# Patient Record
Sex: MALE | Race: AMERICAN INDIAN OR ALASKA NATIVE
[De-identification: names, ages, dates, MRNs, and addresses within clinical notes are randomized per-mention and may not be internally consistent; named-entity substitution may affect disease eponyms.]

---

## 2022-07-06 ENCOUNTER — HOSPITAL ENCOUNTER (EMERGENCY)
Dept: HOSPITAL 5 - ED | Age: 47
LOS: 5 days | Discharge: PSYCHIATRIC OR SUBSTANCE USE FACILITY | End: 2022-07-11
Payer: SELF-PAY

## 2022-07-06 DIAGNOSIS — Z79.899: ICD-10-CM

## 2022-07-06 DIAGNOSIS — Z20.822: ICD-10-CM

## 2022-07-06 DIAGNOSIS — F22: Primary | ICD-10-CM

## 2022-07-06 DIAGNOSIS — F20.9: ICD-10-CM

## 2022-07-06 LAB
ALBUMIN SERPL-MCNC: 4.3 G/DL (ref 3.9–5)
ALT SERPL-CCNC: 18 UNITS/L (ref 7–56)
BASOPHILS # (AUTO): 0.1 K/MM3 (ref 0–0.1)
BASOPHILS NFR BLD AUTO: 0.6 % (ref 0–1.8)
BUN SERPL-MCNC: 5 MG/DL (ref 9–20)
BUN/CREAT SERPL: 6 %
CALCIUM SERPL-MCNC: 9.7 MG/DL (ref 8.4–10.2)
EOSINOPHIL # BLD AUTO: 0 K/MM3 (ref 0–0.4)
EOSINOPHIL NFR BLD AUTO: 0.2 % (ref 0–4.3)
HCT VFR BLD CALC: 33.8 % (ref 35.5–45.6)
HEMOLYSIS INDEX: 7
HGB BLD-MCNC: 11.6 GM/DL (ref 11.8–15.2)
LYMPHOCYTES # BLD AUTO: 1.4 K/MM3 (ref 1.2–5.4)
LYMPHOCYTES NFR BLD AUTO: 13.7 % (ref 13.4–35)
MCHC RBC AUTO-ENTMCNC: 34 % (ref 32–34)
MCV RBC AUTO: 90 FL (ref 84–94)
MONOCYTES # (AUTO): 0.6 K/MM3 (ref 0–0.8)
MONOCYTES % (AUTO): 5.9 % (ref 0–7.3)
PLATELET # BLD: 258 K/MM3 (ref 140–440)
RBC # BLD AUTO: 3.74 M/MM3 (ref 3.65–5.03)

## 2022-07-06 PROCEDURE — 81001 URINALYSIS AUTO W/SCOPE: CPT

## 2022-07-06 PROCEDURE — 99285 EMERGENCY DEPT VISIT HI MDM: CPT

## 2022-07-06 PROCEDURE — 96372 THER/PROPH/DIAG INJ SC/IM: CPT

## 2022-07-06 PROCEDURE — G0480 DRUG TEST DEF 1-7 CLASSES: HCPCS

## 2022-07-06 PROCEDURE — 36415 COLL VENOUS BLD VENIPUNCTURE: CPT

## 2022-07-06 PROCEDURE — U0003 INFECTIOUS AGENT DETECTION BY NUCLEIC ACID (DNA OR RNA); SEVERE ACUTE RESPIRATORY SYNDROME CORONAVIRUS 2 (SARS-COV-2) (CORONAVIRUS DISEASE [COVID-19]), AMPLIFIED PROBE TECHNIQUE, MAKING USE OF HIGH THROUGHPUT TECHNOLOGIES AS DESCRIBED BY CMS-2020-01-R: HCPCS

## 2022-07-06 PROCEDURE — 80320 DRUG SCREEN QUANTALCOHOLS: CPT

## 2022-07-06 PROCEDURE — 80053 COMPREHEN METABOLIC PANEL: CPT

## 2022-07-06 PROCEDURE — 85025 COMPLETE CBC W/AUTO DIFF WBC: CPT

## 2022-07-06 PROCEDURE — 80307 DRUG TEST PRSMV CHEM ANLYZR: CPT

## 2022-07-06 NOTE — EMERGENCY DEPARTMENT REPORT
ED General Adult HPI





- General


Chief complaint: Psych


Stated complaint: MENTAL PROBLEMS/HAVENT TAKEN MEDS


Time Seen by Provider: 07/06/22 20:25


Source: patient


Mode of arrival: Ambulatory


Limitations: No Limitations





- History of Present Illness


Initial comments: 





The patient presents to the emergency department with a chief complaint of 

abandonment.  Patient has a past medical history of schizoaffective disorder.  

Patient states he was staying with a  for the last 2 to 3 weeks.  He 

states today he was told by the  that he was becoming too difficult to 

manage and brought him to the hospital for evaluation.  Patient slightly 

paranoid on exam.  He denies homicidal suicidal ideation.  He also denies 

auditory visual hallucinations.


Severity scale (0 -10): 0


Improves with: none


Worsens with: none


Associated Symptoms: denies other symptoms


Treatments Prior to Arrival: none





- Related Data


                                    Allergies











Allergy/AdvReac Type Severity Reaction Status Date / Time


 


No Known Allergies Allergy   Unverified 07/06/22 20:06














ED Review of Systems


ROS: 


Stated complaint: MENTAL PROBLEMS/HAVENT TAKEN MEDS


Other details as noted in HPI





Comment: All other systems reviewed and negative


Constitutional: denies: chills, fever


Eyes: denies: eye pain, eye discharge, vision change


ENT: denies: ear pain, throat pain


Respiratory: denies: cough, shortness of breath, wheezing


Cardiovascular: denies: chest pain, palpitations


Endocrine: no symptoms reported


Gastrointestinal: denies: abdominal pain, nausea, diarrhea


Genitourinary: denies: urgency, dysuria


Musculoskeletal: denies: back pain, joint swelling, arthralgia


Skin: denies: rash, lesions


Neurological: denies: headache, weakness, paresthesias


Psychiatric: denies: anxiety, depression


Hematological/Lymphatic: denies: easy bleeding, easy bruising





ED Past Medical Hx





- Past Medical History


Previous Medical History?: Yes


Additional medical history: schizophrenia





- Surgical History


Past Surgical History?: No





- Social History


Smoking Status: Unknown if ever smoked





ED Physical Exam





- General


Limitations: No Limitations


General appearance: alert, in no apparent distress





- Head


Head exam: Present: atraumatic, normocephalic





- Eye


Eye exam: Present: normal appearance, PERRL, EOMI





- ENT


ENT exam: Present: mucous membranes moist





- Neck


Neck exam: Present: normal inspection





- Respiratory


Respiratory exam: Present: normal lung sounds bilaterally.  Absent: respiratory 

distress





- Cardiovascular


Cardiovascular Exam: Present: regular rate, normal rhythm.  Absent: systolic 

murmur, diastolic murmur, rubs, gallop





- GI/Abdominal


GI/Abdominal exam: Present: soft, normal bowel sounds.  Absent: distended, 

tenderness





- Rectal


Rectal exam: Present: deferred





- Extremities Exam


Extremities exam: Present: normal inspection





- Back Exam


Back exam: Present: normal inspection





- Neurological Exam


Neurological exam: Present: alert, oriented X3, CN II-XII intact.  Absent: motor

sensory deficit





- Psychiatric


Psychiatric exam: Present: other (paranoid)





- Skin


Skin exam: Present: warm, dry, intact, normal color.  Absent: rash





ED Course


                                   Vital Signs











  07/06/22 07/06/22





  20:03 21:03


 


Temperature 98.0 F 


 


Pulse Rate 106 H 111 H


 


Respiratory 16 19





Rate  


 


Blood Pressure 223/105 


 


Blood Pressure  187/122





[Right]  


 


O2 Sat by Pulse 97 99





Oximetry  














ED Medical Decision Making





- Lab Data


Result diagrams: 


                                 07/06/22 21:11





                                 07/06/22 21:11








                                   Lab Results











  07/06/22 07/06/22 07/06/22 Range/Units





  21:11 21:11 21:11 


 


WBC  10.2    (4.5-11.0)  K/mm3


 


RBC  3.74    (3.65-5.03)  M/mm3


 


Hgb  11.6 L    (11.8-15.2)  gm/dl


 


Hct  33.8 L    (35.5-45.6)  %


 


MCV  90    (84-94)  fl


 


MCH  31    (28-32)  pg


 


MCHC  34    (32-34)  %


 


RDW  14.2    (13.2-15.2)  %


 


Plt Count  258    (140-440)  K/mm3


 


Lymph % (Auto)  13.7    (13.4-35.0)  %


 


Mono % (Auto)  5.9    (0.0-7.3)  %


 


Eos % (Auto)  0.2    (0.0-4.3)  %


 


Baso % (Auto)  0.6    (0.0-1.8)  %


 


Lymph # (Auto)  1.4    (1.2-5.4)  K/mm3


 


Mono # (Auto)  0.6    (0.0-0.8)  K/mm3


 


Eos # (Auto)  0.0    (0.0-0.4)  K/mm3


 


Baso # (Auto)  0.1    (0.0-0.1)  K/mm3


 


Seg Neutrophils %  79.6 H    (40.0-70.0)  %


 


Seg Neutrophils #  8.1 H    (1.8-7.7)  K/mm3


 


Sodium     (137-145)  mmol/L


 


Potassium     (3.6-5.0)  mmol/L


 


Chloride     ()  mmol/L


 


Carbon Dioxide     (22-30)  mmol/L


 


Anion Gap     mmol/L


 


BUN     (9-20)  mg/dL


 


Creatinine     (0.8-1.3)  mg/dL


 


Estimated GFR     ml/min


 


BUN/Creatinine Ratio     %


 


Glucose     ()  mg/dL


 


Calcium     (8.4-10.2)  mg/dL


 


Total Bilirubin     (0.1-1.2)  mg/dL


 


AST     (5-40)  units/L


 


ALT     (7-56)  units/L


 


Alkaline Phosphatase     ()  units/L


 


Total Protein     (6.3-8.2)  g/dL


 


Albumin     (3.9-5)  g/dL


 


Albumin/Globulin Ratio     %


 


Salicylates   < 0.3 L   (2.8-20.0)  mg/dL


 


Acetaminophen    5.0 L  (10.0-30.0)  ug/mL


 


Plasma/Serum Alcohol     (0-0.07)  %














  07/06/22 07/06/22 Range/Units





  21:11 21:11 


 


WBC    (4.5-11.0)  K/mm3


 


RBC    (3.65-5.03)  M/mm3


 


Hgb    (11.8-15.2)  gm/dl


 


Hct    (35.5-45.6)  %


 


MCV    (84-94)  fl


 


MCH    (28-32)  pg


 


MCHC    (32-34)  %


 


RDW    (13.2-15.2)  %


 


Plt Count    (140-440)  K/mm3


 


Lymph % (Auto)    (13.4-35.0)  %


 


Mono % (Auto)    (0.0-7.3)  %


 


Eos % (Auto)    (0.0-4.3)  %


 


Baso % (Auto)    (0.0-1.8)  %


 


Lymph # (Auto)    (1.2-5.4)  K/mm3


 


Mono # (Auto)    (0.0-0.8)  K/mm3


 


Eos # (Auto)    (0.0-0.4)  K/mm3


 


Baso # (Auto)    (0.0-0.1)  K/mm3


 


Seg Neutrophils %    (40.0-70.0)  %


 


Seg Neutrophils #    (1.8-7.7)  K/mm3


 


Sodium  141   (137-145)  mmol/L


 


Potassium  3.5 L   (3.6-5.0)  mmol/L


 


Chloride  105.9   ()  mmol/L


 


Carbon Dioxide  22   (22-30)  mmol/L


 


Anion Gap  17   mmol/L


 


BUN  5 L   (9-20)  mg/dL


 


Creatinine  0.9   (0.8-1.3)  mg/dL


 


Estimated GFR  > 60   ml/min


 


BUN/Creatinine Ratio  6   %


 


Glucose  110 H   ()  mg/dL


 


Calcium  9.7   (8.4-10.2)  mg/dL


 


Total Bilirubin  0.30   (0.1-1.2)  mg/dL


 


AST  17   (5-40)  units/L


 


ALT  18   (7-56)  units/L


 


Alkaline Phosphatase  49   ()  units/L


 


Total Protein  6.7   (6.3-8.2)  g/dL


 


Albumin  4.3   (3.9-5)  g/dL


 


Albumin/Globulin Ratio  1.8   %


 


Salicylates    (2.8-20.0)  mg/dL


 


Acetaminophen    (10.0-30.0)  ug/mL


 


Plasma/Serum Alcohol   < 0.01  (0-0.07)  %














- Medical Decision Making





Awaiting medical clearance


Patient was evaluated by mental health and the patient will be awaiting a 

psychiatric evaluation in the morning due to inconsistencies with his story.


Critical care attestation.: 


If time is entered above; I have spent that time in minutes in the direct care 

of this critically ill patient, excluding procedure time.








ED Disposition


Clinical Impression: 


 Paranoia





Disposition: [address] [place] [address]


Is pt being admited?: No


Does the pt Need Aspirin: No


Condition: Stable


Additional Instructions: 


Professional and Agency Contacts To help Resolve Crises(24/7)


[place] Crisis Line: 1-345.907.6089


Suicide Prevention Line: 1-683.786.2091


Crisis Text Line: Text START to 815787


Emergency: 911





Outpatient COMMUNITY Behavioral Health Resources:





[place]: [facility]


[address], [place] [address] Phone: [phone]





[place]:


[facility]


[address] [place] [address]


Phone: [phone]





[place]:


[facility] -


[address] [place] [address]


Phone: [phone]


Monday thru Friday - 8am - 5pm





[facility]:


[facility]


Address: [address], [place] [address]


Phone: [phone]





[place]:


[facility]


Address: [address] [place] [address]


Monday thru Friday- 7am-2pm


Phone: [phone]





[facility]


Address: [address] [place] [address]


Monday thru Friday: 8:30AM-5PM


Phone: [phone]

## 2022-07-07 LAB
MUCOUS THREADS #/AREA URNS HPF: (no result) /HPF
PH UR STRIP: 6.5 [PH] (ref 5–7)
PROT UR STRIP-MCNC: (no result) MG/DL
UROBILINOGEN UR-MCNC: 0 MG/DL (ref ?–2)
WBC #/AREA URNS HPF: 1 /HPF (ref 0–6)

## 2022-07-07 NOTE — EVENT NOTE
Date: 07/07/22





Patient reassessed today and is minimally verbal.  He is in no acute distress.  

Will maintain 1013 status.

## 2022-07-07 NOTE — CONSULTATION
History of Present Illness





- Reason for Consult


Consult date: 07/07/22


Reason for consult: psychosis





- History of Present Psychiatric Illness


HPI: The patient presents to the emergency department with a chief complaint of 

abandonment.  Patient has a past medical history of schizoaffective disorder.  

Patient states he was staying with a  for the last 2 to 3 weeks.  He 

states today he was told by the  that he was becoming too difficult to 

manage and brought him to the hospital for evaluation.  Patient slightly 

paranoid on exam.  He denies homicidal suicidal ideation.  He also denies 

auditory visual hallucinations.





The patient was seen today. He denies hallucinations, but he responding to 

internal stimuli. He is staring intensely and slow to respond. He doesn't 

respond to most questioning, only stares.  





PAST PSYCHIATRIC HISTORY:


Unable to assess





PAST MEDICAL HISTORY: None reported





Family Psychiatric History: None reported or documented





SOCIAL HISTORY


Unable to assess





REVIEW OF SYSTEMS


Unable to assess


 


MENTAL STATUS EXAMINATION


Unable to assess





Diagnoses: 


Schizoaffective Disorder





Treatment Plan


1013


Olanzapine 7.5mg po daily


Trazodone 50mg po qhs


Sitter: per primary


Medical: Per primary


Disposition: Recommend acute psychiatric inpatient treatment


Will follow. Thanks


Case staffed by Dr. Tan





Medications and Allergies


                                    Allergies











Allergy/AdvReac Type Severity Reaction Status Date / Time


 


No Known Allergies Allergy   Unverified 07/06/22 20:06














Mental Status Exam





- Vital signs


                                Last Vital Signs











Temp  97.9 F   07/07/22 09:21


 


Pulse  74   07/07/22 09:21


 


Resp  18   07/07/22 09:21


 


BP  193/104   07/07/22 09:21


 


Pulse Ox  100   07/07/22 09:22














Results


Result Diagrams: 


                                 07/06/22 21:11





                                 07/06/22 21:11


                              Abnormal lab results











  07/06/22 07/06/22 07/06/22 Range/Units





  21:11 21:11 21:11 


 


Hgb  11.6 L    (11.8-15.2)  gm/dl


 


Hct  33.8 L    (35.5-45.6)  %


 


Seg Neutrophils %  79.6 H    (40.0-70.0)  %


 


Seg Neutrophils #  8.1 H    (1.8-7.7)  K/mm3


 


Potassium     (3.6-5.0)  mmol/L


 


BUN     (9-20)  mg/dL


 


Glucose     ()  mg/dL


 


Salicylates   < 0.3 L   (2.8-20.0)  mg/dL


 


Acetaminophen    5.0 L  (10.0-30.0)  ug/mL














  07/06/22 Range/Units





  21:11 


 


Hgb   (11.8-15.2)  gm/dl


 


Hct   (35.5-45.6)  %


 


Seg Neutrophils %   (40.0-70.0)  %


 


Seg Neutrophils #   (1.8-7.7)  K/mm3


 


Potassium  3.5 L  (3.6-5.0)  mmol/L


 


BUN  5 L  (9-20)  mg/dL


 


Glucose  110 H  ()  mg/dL


 


Salicylates   (2.8-20.0)  mg/dL


 


Acetaminophen   (10.0-30.0)  ug/mL








All other labs normal.

## 2022-07-08 NOTE — PROGRESS NOTE
Subjective





- Reason for Consult


Reason for consult: PSYCHOSIS





- Chief Complaint


Chief complaint: 








07/08/22:  Patient seen today, still very hyper verbal and aggressive. PPatient 

seen banging on his door and shouting. Unable to get any concrete assessment out

of him. No medication changes at this time.


History of Present Illness





- Reason for Consult


Consult date: 07/07/22


Reason for consult: psychosis





- History of Present Psychiatric Illness


HPI: The patient presents to the emergency department with a chief complaint of 

abandonment.  Patient has a past medical history of schizoaffective disorder.  

Patient states he was staying with a  for the last 2 to 3 weeks.  He 

states today he was told by the  that he was becoming too difficult to 

manage and brought him to the hospital for evaluation.  Patient slightly 

paranoid on exam.  He denies homicidal suicidal ideation.  He also denies 

auditory visual hallucinations.





The patient was seen today. He denies hallucinations, but he responding to 

internal stimuli. He is staring intensely and slow to respond. He doesn't 

respond to most questioning, only stares.  





PAST PSYCHIATRIC HISTORY:


Unable to assess





PAST MEDICAL HISTORY: None reported





Family Psychiatric History: None reported or documented





SOCIAL HISTORY


Unable to assess





REVIEW OF SYSTEMS


Unable to assess


 


MENTAL STATUS EXAMINATION


Unable to assess





Diagnoses: 


Schizoaffective Disorder





Treatment Plan


1013


Olanzapine 7.5mg po daily


Trazodone 50mg po qhs


Sitter: per primary


Medical: Per primary


Disposition: Recommend acute psychiatric inpatient treatment


Will follow. Thanks


Case staffed by Dr. Tan





Medications and Allergies


                                    Allergies











Allergy/AdvReac Type Severity Reaction Status Date / Time


 


No Known Allergies Allergy   Unverified 07/06/22 20:06














Mental Status Exam





- Vital signs


                                Last Vital Signs











Temp  97.9 F   07/07/22 09:21


 


Pulse  67   07/07/22 15:33


 


Resp  18   07/07/22 09:21


 


BP  182/126   07/07/22 15:33


 


Pulse Ox  98   07/08/22 11:26

## 2022-07-09 RX ADMIN — METOPROLOL TARTRATE SCH MG: 25 TABLET, FILM COATED ORAL at 22:18

## 2022-07-09 NOTE — PROGRESS NOTE
Subjective





- Reason for Consult


Consult date: 07/09/22


Reason for consult: MHE





- Chief Complaint


Chief complaint: 





07/09/2022: Patient seen today in his room. Patient calmer today lying down on 

his bed. Patient states that "I just wants to take my medications" Patient 

states that he is eating well and sleeping well. Patient denies any SI/HI at 

this time. Patient will continue with current medications while waiting on 

inpatient treatment.











07/08/22:  Patient seen today, still very hyper verbal and aggressive. PPatient 

seen banging on his door and shouting. Unable to get any concrete assessment out

of him. No medication changes at this time.


History of Present Illness





- Reason for Consult


Consult date: 07/07/22


Reason for consult: psychosis





- History of Present Psychiatric Illness


HPI: The patient presents to the emergency department with a chief complaint of 

abandonment.  Patient has a past medical history of schizoaffective disorder.  

Patient states he was staying with a  for the last 2 to 3 weeks.  He 

states today he was told by the  that he was becoming too difficult to 

manage and brought him to the hospital for evaluation.  Patient slightly 

paranoid on exam.  He denies homicidal suicidal ideation.  He also denies 

auditory visual hallucinations.





The patient was seen today. He denies hallucinations, but he responding to 

internal stimuli. He is staring intensely and slow to respond. He doesn't 

respond to most questioning, only stares.  





PAST PSYCHIATRIC HISTORY:


Unable to assess





PAST MEDICAL HISTORY: None reported





Family Psychiatric History: None reported or documented





SOCIAL HISTORY


Unable to assess





REVIEW OF SYSTEMS


Unable to assess


 


MENTAL STATUS EXAMINATION


Unable to assess





Diagnoses: 


Schizoaffective Disorder





Treatment Plan


1013


Olanzapine 7.5mg po daily


Trazodone 50mg po qhs


Sitter: per primary


Medical: Per primary


Disposition: Recommend acute psychiatric inpatient treatment


Will follow. Thanks


Case staffed by Dr. Tan





Medications and Allergies


                                    Allergies











Allergy/AdvReac Type Severity Reaction Status Date / Time


 


No Known Allergies Allergy   Unverified 07/06/22 20:06














Mental Status Exam





- Vital signs


                                Last Vital Signs











Temp  98.6 F   07/09/22 10:14


 


Pulse  80   07/09/22 10:14


 


Resp  20   07/09/22 10:14


 


BP  167/97   07/09/22 10:14


 


Pulse Ox  99   07/09/22 10:16

## 2022-07-09 NOTE — EVENT NOTE
Date: 07/09/22





The patient was evaluated in the emergency department for symptoms described in 

the history of present illness.  He/she was evaluated in the context of the 

global COVID-19 pandemic, which necessitated consideration that the patient 

might be at risk for infection with the virus that causes COVID-19.  

Institutional protocols and algorithms that pertain to the evaluation of 

patients at risk for COVID-19 are in a state of rapid change based on 

information released by regulatory bodies including the CDC and federal and 

state organizations.  These policies and algorithms were followed during the 

patient's care in the emergency department.  Please note that these policies, 

procedures and recommendations changed on a rapid basis.





Laboratory studies, vital signs, nursing documentation, ER documentation, and 

psychiatric documentation are reviewed and appreciated.  Nursing team reports no

acute events this morning or concerns.





The patient is awake and ambulating and does not appear to be in any acute 

distress.





The patient was deemed medically suitable for psychiatric disposition and 

placement during his initial ER evaluation.  The patient continues to remain 

medically suitable for psychiatric placement and disposition.


He is currently pending psychiatric placement.








Found to have elevated blood pressure, uncertain if patient has a chronic 

history of high blood pressure or if he is agitated or both.  We will start low-

dose metoprolol empirically.  As per psychiatric  documentation, 

elevated blood pressure is the reason why patient has not been placed at this 

time.  Please note that the patient's elevated blood pressure does not represent

an immediate or emergent medical contraindication to psychiatric placement and d

isposition at this time








                                   Vital Signs











  07/06/22 07/06/22 07/07/22





  20:03 21:03 04:56


 


Temperature 98.0 F  98 F


 


Pulse Rate 106 H 111 H 100 H


 


Respiratory 16 19 18





Rate   


 


Blood Pressure 223/105  


 


Blood Pressure  187/122 176/100





[Right]   


 


O2 Sat by Pulse 97 99 99





Oximetry   














  07/07/22 07/07/22 07/07/22





  09:21 09:22 14:22


 


Temperature 97.9 F  


 


Pulse Rate 74  


 


Respiratory 18  





Rate   


 


Blood Pressure   


 


Blood Pressure 193/104  182/126





[Right]   


 


O2 Sat by Pulse 100 100 





Oximetry   














  07/07/22 07/08/22 07/08/22





  15:33 05:25 11:26


 


Temperature   


 


Pulse Rate 67  


 


Respiratory   





Rate   


 


Blood Pressure 182/126  


 


Blood Pressure   





[Right]   


 


O2 Sat by Pulse  99 98





Oximetry   














  07/08/22 07/09/22 07/09/22





  20:04 10:14 10:16


 


Temperature 99.5 F 98.6 F 


 


Pulse Rate 80 80 


 


Respiratory 18 20 





Rate   


 


Blood Pressure   


 


Blood Pressure 182/88 167/97 





[Right]   


 


O2 Sat by Pulse 97 97 99





Oximetry   








                                   Lab Results











  07/06/22 07/06/22 07/06/22 Range/Units





  21:11 21:11 21:11 


 


WBC  10.2    (4.5-11.0)  K/mm3


 


RBC  3.74    (3.65-5.03)  M/mm3


 


Hgb  11.6 L    (11.8-15.2)  gm/dl


 


Hct  33.8 L    (35.5-45.6)  %


 


MCV  90    (84-94)  fl


 


MCH  31    (28-32)  pg


 


MCHC  34    (32-34)  %


 


RDW  14.2    (13.2-15.2)  %


 


Plt Count  258    (140-440)  K/mm3


 


Lymph % (Auto)  13.7    (13.4-35.0)  %


 


Mono % (Auto)  5.9    (0.0-7.3)  %


 


Eos % (Auto)  0.2    (0.0-4.3)  %


 


Baso % (Auto)  0.6    (0.0-1.8)  %


 


Lymph # (Auto)  1.4    (1.2-5.4)  K/mm3


 


Mono # (Auto)  0.6    (0.0-0.8)  K/mm3


 


Eos # (Auto)  0.0    (0.0-0.4)  K/mm3


 


Baso # (Auto)  0.1    (0.0-0.1)  K/mm3


 


Seg Neutrophils %  79.6 H    (40.0-70.0)  %


 


Seg Neutrophils #  8.1 H    (1.8-7.7)  K/mm3


 


Sodium     (137-145)  mmol/L


 


Potassium     (3.6-5.0)  mmol/L


 


Chloride     ()  mmol/L


 


Carbon Dioxide     (22-30)  mmol/L


 


Anion Gap     mmol/L


 


BUN     (9-20)  mg/dL


 


Creatinine     (0.8-1.3)  mg/dL


 


Estimated GFR     ml/min


 


BUN/Creatinine Ratio     %


 


Glucose     ()  mg/dL


 


Calcium     (8.4-10.2)  mg/dL


 


Total Bilirubin     (0.1-1.2)  mg/dL


 


AST     (5-40)  units/L


 


ALT     (7-56)  units/L


 


Alkaline Phosphatase     ()  units/L


 


Total Protein     (6.3-8.2)  g/dL


 


Albumin     (3.9-5)  g/dL


 


Albumin/Globulin Ratio     %


 


Urine Color     (Yellow)  


 


Urine Turbidity     (Clear)  


 


Urine pH     (5.0-7.0)  


 


Ur Specific Gravity     (1.003-1.030)  


 


Urine Protein     (Negative)  mg/dL


 


Urine Glucose (UA)     (Negative)  mg/dL


 


Urine Ketones     (Negative)  mg/dL


 


Urine Blood     (Negative)  


 


Urine Nitrite     (Negative)  


 


Urine Bilirubin     (Negative)  


 


Urine Urobilinogen     (<2.0)  mg/dL


 


Ur Leukocyte Esterase     (Negative)  


 


Urine WBC (Auto)     (0.0-6.0)  /HPF


 


Urine RBC (Auto)     


 


Urine Mucus     /HPF


 


Salicylates   < 0.3 L   (2.8-20.0)  mg/dL


 


Urine Opiates Screen     


 


Urine Methadone Screen     


 


Acetaminophen    5.0 L  (10.0-30.0)  ug/mL


 


Ur Barbiturates Screen     


 


Ur Phencyclidine Scrn     


 


Ur Amphetamines Screen     


 


U Benzodiazepines Scrn     


 


Urine Cocaine Screen     


 


U Marijuana (THC) Screen     


 


Drugs of Abuse Note     


 


Plasma/Serum Alcohol     (0-0.07)  %


 


SARS-CoV-2 (PCR)     (Negative)  














  07/06/22 07/06/22 07/07/22 Range/Units





  21:11 21:11 09:05 


 


WBC     (4.5-11.0)  K/mm3


 


RBC     (3.65-5.03)  M/mm3


 


Hgb     (11.8-15.2)  gm/dl


 


Hct     (35.5-45.6)  %


 


MCV     (84-94)  fl


 


MCH     (28-32)  pg


 


MCHC     (32-34)  %


 


RDW     (13.2-15.2)  %


 


Plt Count     (140-440)  K/mm3


 


Lymph % (Auto)     (13.4-35.0)  %


 


Mono % (Auto)     (0.0-7.3)  %


 


Eos % (Auto)     (0.0-4.3)  %


 


Baso % (Auto)     (0.0-1.8)  %


 


Lymph # (Auto)     (1.2-5.4)  K/mm3


 


Mono # (Auto)     (0.0-0.8)  K/mm3


 


Eos # (Auto)     (0.0-0.4)  K/mm3


 


Baso # (Auto)     (0.0-0.1)  K/mm3


 


Seg Neutrophils %     (40.0-70.0)  %


 


Seg Neutrophils #     (1.8-7.7)  K/mm3


 


Sodium  141    (137-145)  mmol/L


 


Potassium  3.5 L    (3.6-5.0)  mmol/L


 


Chloride  105.9    ()  mmol/L


 


Carbon Dioxide  22    (22-30)  mmol/L


 


Anion Gap  17    mmol/L


 


BUN  5 L    (9-20)  mg/dL


 


Creatinine  0.9    (0.8-1.3)  mg/dL


 


Estimated GFR  > 60    ml/min


 


BUN/Creatinine Ratio  6    %


 


Glucose  110 H    ()  mg/dL


 


Calcium  9.7    (8.4-10.2)  mg/dL


 


Total Bilirubin  0.30    (0.1-1.2)  mg/dL


 


AST  17    (5-40)  units/L


 


ALT  18    (7-56)  units/L


 


Alkaline Phosphatase  49    ()  units/L


 


Total Protein  6.7    (6.3-8.2)  g/dL


 


Albumin  4.3    (3.9-5)  g/dL


 


Albumin/Globulin Ratio  1.8    %


 


Urine Color     (Yellow)  


 


Urine Turbidity     (Clear)  


 


Urine pH     (5.0-7.0)  


 


Ur Specific Gravity     (1.003-1.030)  


 


Urine Protein     (Negative)  mg/dL


 


Urine Glucose (UA)     (Negative)  mg/dL


 


Urine Ketones     (Negative)  mg/dL


 


Urine Blood     (Negative)  


 


Urine Nitrite     (Negative)  


 


Urine Bilirubin     (Negative)  


 


Urine Urobilinogen     (<2.0)  mg/dL


 


Ur Leukocyte Esterase     (Negative)  


 


Urine WBC (Auto)     (0.0-6.0)  /HPF


 


Urine RBC (Auto)     


 


Urine Mucus     /HPF


 


Salicylates     (2.8-20.0)  mg/dL


 


Urine Opiates Screen     


 


Urine Methadone Screen     


 


Acetaminophen     (10.0-30.0)  ug/mL


 


Ur Barbiturates Screen     


 


Ur Phencyclidine Scrn     


 


Ur Amphetamines Screen     


 


U Benzodiazepines Scrn     


 


Urine Cocaine Screen     


 


U Marijuana (THC) Screen     


 


Drugs of Abuse Note     


 


Plasma/Serum Alcohol   < 0.01   (0-0.07)  %


 


SARS-CoV-2 (PCR)    Negative  (Negative)  














  07/07/22 07/07/22 Range/Units





  Unknown Unknown 


 


WBC    (4.5-11.0)  K/mm3


 


RBC    (3.65-5.03)  M/mm3


 


Hgb    (11.8-15.2)  gm/dl


 


Hct    (35.5-45.6)  %


 


MCV    (84-94)  fl


 


MCH    (28-32)  pg


 


MCHC    (32-34)  %


 


RDW    (13.2-15.2)  %


 


Plt Count    (140-440)  K/mm3


 


Lymph % (Auto)    (13.4-35.0)  %


 


Mono % (Auto)    (0.0-7.3)  %


 


Eos % (Auto)    (0.0-4.3)  %


 


Baso % (Auto)    (0.0-1.8)  %


 


Lymph # (Auto)    (1.2-5.4)  K/mm3


 


Mono # (Auto)    (0.0-0.8)  K/mm3


 


Eos # (Auto)    (0.0-0.4)  K/mm3


 


Baso # (Auto)    (0.0-0.1)  K/mm3


 


Seg Neutrophils %    (40.0-70.0)  %


 


Seg Neutrophils #    (1.8-7.7)  K/mm3


 


Sodium    (137-145)  mmol/L


 


Potassium    (3.6-5.0)  mmol/L


 


Chloride    ()  mmol/L


 


Carbon Dioxide    (22-30)  mmol/L


 


Anion Gap    mmol/L


 


BUN    (9-20)  mg/dL


 


Creatinine    (0.8-1.3)  mg/dL


 


Estimated GFR    ml/min


 


BUN/Creatinine Ratio    %


 


Glucose    ()  mg/dL


 


Calcium    (8.4-10.2)  mg/dL


 


Total Bilirubin    (0.1-1.2)  mg/dL


 


AST    (5-40)  units/L


 


ALT    (7-56)  units/L


 


Alkaline Phosphatase    ()  units/L


 


Total Protein    (6.3-8.2)  g/dL


 


Albumin    (3.9-5)  g/dL


 


Albumin/Globulin Ratio    %


 


Urine Color  Colorless   (Yellow)  


 


Urine Turbidity  Clear   (Clear)  


 


Urine pH  6.5   (5.0-7.0)  


 


Ur Specific Gravity  1.015   (1.003-1.030)  


 


Urine Protein  <30 mg dl   (Negative)  mg/dL


 


Urine Glucose (UA)  Negative   (Negative)  mg/dL


 


Urine Ketones  Negative   (Negative)  mg/dL


 


Urine Blood  Negative   (Negative)  


 


Urine Nitrite  Negative   (Negative)  


 


Urine Bilirubin  Negative   (Negative)  


 


Urine Urobilinogen  0.0   (<2.0)  mg/dL


 


Ur Leukocyte Esterase  Negative   (Negative)  


 


Urine WBC (Auto)  1.0   (0.0-6.0)  /HPF


 


Urine RBC (Auto)  Not Reportable   


 


Urine Mucus  Few   /HPF


 


Salicylates    (2.8-20.0)  mg/dL


 


Urine Opiates Screen   Negative  


 


Urine Methadone Screen   Negative  


 


Acetaminophen    (10.0-30.0)  ug/mL


 


Ur Barbiturates Screen   Negative  


 


Ur Phencyclidine Scrn   Negative  


 


Ur Amphetamines Screen   Negative  


 


U Benzodiazepines Scrn   Negative  


 


Urine Cocaine Screen   Negative  


 


U Marijuana (THC) Screen   Positive  


 


Drugs of Abuse Note   Disclamer  


 


Plasma/Serum Alcohol    (0-0.07)  %


 


SARS-CoV-2 (PCR)    (Negative)

## 2022-07-10 VITALS — DIASTOLIC BLOOD PRESSURE: 85 MMHG | SYSTOLIC BLOOD PRESSURE: 136 MMHG

## 2022-07-10 RX ADMIN — METOPROLOL TARTRATE SCH MG: 25 TABLET, FILM COATED ORAL at 09:34

## 2022-07-10 RX ADMIN — METOPROLOL TARTRATE SCH MG: 25 TABLET, FILM COATED ORAL at 22:43

## 2022-07-10 NOTE — EVENT NOTE
Date: 07/10/22





The patient was evaluated in the emergency department for symptoms described in 

the history of present illness.  He/she was evaluated in the context of the 

global COVID-19 pandemic, which necessitated consideration that the patient 

might be at risk for infection with the virus that causes COVID-19.  

Institutional protocols and algorithms that pertain to the evaluation of 

patients at risk for COVID-19 are in a state of rapid change based on 

information released by regulatory bodies including the CDC and federal and 

state organizations.  These policies and algorithms were followed during the 

patient's care in the emergency department.  Please note that these policies, 

procedures and recommendations changed on a rapid basis.





Laboratory studies, vital signs, nursing documentation, ER documentation, and 

psychiatric documentation are reviewed and appreciated.  Nursing team reports no

acute events this morning or concerns.





The patient is awake and ambulating and does not appear to be in any acute 

distress.





The patient was deemed medically suitable for psychiatric disposition and 

placement during his initial ER evaluation.  The patient continues to remain 

medically suitable for psychiatric placement and disposition.


He is currently pending psychiatric placement.


Nursing team reports that patient went to the bathroom and ate adequately today.








                                   Vital Signs











  07/06/22 07/06/22 07/07/22





  20:03 21:03 04:56


 


Temperature 98.0 F  98 F


 


Pulse Rate 106 H 111 H 100 H


 


Respiratory 16 19 18





Rate   


 


Blood Pressure 223/105  


 


Blood Pressure  187/122 176/100





[Right]   


 


O2 Sat by Pulse 97 99 99





Oximetry   














  07/07/22 07/07/22 07/07/22





  09:21 09:22 14:22


 


Temperature 97.9 F  


 


Pulse Rate 74  


 


Respiratory 18  





Rate   


 


Blood Pressure   


 


Blood Pressure 193/104  182/126





[Right]   


 


O2 Sat by Pulse 100 100 





Oximetry   














  07/07/22 07/08/22 07/08/22





  15:33 05:25 11:26


 


Temperature   


 


Pulse Rate 67  


 


Respiratory   





Rate   


 


Blood Pressure 182/126  


 


Blood Pressure   





[Right]   


 


O2 Sat by Pulse  99 98





Oximetry   














  07/08/22 07/09/22 07/09/22





  20:04 10:14 10:16


 


Temperature 99.5 F 98.6 F 


 


Pulse Rate 80 80 


 


Respiratory 18 20 





Rate   


 


Blood Pressure   


 


Blood Pressure 182/88 167/97 





[Right]   


 


O2 Sat by Pulse 97 97 99





Oximetry   














  07/09/22 07/09/22 07/10/22





  21:00 22:18 04:32


 


Temperature 99.0 F  98.5 F


 


Pulse Rate 88 89 64


 


Respiratory 18  16





Rate   


 


Blood Pressure  150/105 


 


Blood Pressure 150/105  140/70





[Right]   


 


O2 Sat by Pulse 97  97





Oximetry   














  07/10/22 07/10/22 07/10/22





  08:05 09:22 09:34


 


Temperature  98.6 F 


 


Pulse Rate  70 70


 


Respiratory  18 





Rate   


 


Blood Pressure   150/95


 


Blood Pressure  150/95 





[Right]   


 


O2 Sat by Pulse 97 100 





Oximetry   








                                   Lab Results











  07/06/22 07/06/22 07/06/22 Range/Units





  21:11 21:11 21:11 


 


WBC  10.2    (4.5-11.0)  K/mm3


 


RBC  3.74    (3.65-5.03)  M/mm3


 


Hgb  11.6 L    (11.8-15.2)  gm/dl


 


Hct  33.8 L    (35.5-45.6)  %


 


MCV  90    (84-94)  fl


 


MCH  31    (28-32)  pg


 


MCHC  34    (32-34)  %


 


RDW  14.2    (13.2-15.2)  %


 


Plt Count  258    (140-440)  K/mm3


 


Lymph % (Auto)  13.7    (13.4-35.0)  %


 


Mono % (Auto)  5.9    (0.0-7.3)  %


 


Eos % (Auto)  0.2    (0.0-4.3)  %


 


Baso % (Auto)  0.6    (0.0-1.8)  %


 


Lymph # (Auto)  1.4    (1.2-5.4)  K/mm3


 


Mono # (Auto)  0.6    (0.0-0.8)  K/mm3


 


Eos # (Auto)  0.0    (0.0-0.4)  K/mm3


 


Baso # (Auto)  0.1    (0.0-0.1)  K/mm3


 


Seg Neutrophils %  79.6 H    (40.0-70.0)  %


 


Seg Neutrophils #  8.1 H    (1.8-7.7)  K/mm3


 


Sodium     (137-145)  mmol/L


 


Potassium     (3.6-5.0)  mmol/L


 


Chloride     ()  mmol/L


 


Carbon Dioxide     (22-30)  mmol/L


 


Anion Gap     mmol/L


 


BUN     (9-20)  mg/dL


 


Creatinine     (0.8-1.3)  mg/dL


 


Estimated GFR     ml/min


 


BUN/Creatinine Ratio     %


 


Glucose     ()  mg/dL


 


Calcium     (8.4-10.2)  mg/dL


 


Total Bilirubin     (0.1-1.2)  mg/dL


 


AST     (5-40)  units/L


 


ALT     (7-56)  units/L


 


Alkaline Phosphatase     ()  units/L


 


Total Protein     (6.3-8.2)  g/dL


 


Albumin     (3.9-5)  g/dL


 


Albumin/Globulin Ratio     %


 


Urine Color     (Yellow)  


 


Urine Turbidity     (Clear)  


 


Urine pH     (5.0-7.0)  


 


Ur Specific Gravity     (1.003-1.030)  


 


Urine Protein     (Negative)  mg/dL


 


Urine Glucose (UA)     (Negative)  mg/dL


 


Urine Ketones     (Negative)  mg/dL


 


Urine Blood     (Negative)  


 


Urine Nitrite     (Negative)  


 


Urine Bilirubin     (Negative)  


 


Urine Urobilinogen     (<2.0)  mg/dL


 


Ur Leukocyte Esterase     (Negative)  


 


Urine WBC (Auto)     (0.0-6.0)  /HPF


 


Urine RBC (Auto)     


 


Urine Mucus     /HPF


 


Salicylates   < 0.3 L   (2.8-20.0)  mg/dL


 


Urine Opiates Screen     


 


Urine Methadone Screen     


 


Acetaminophen    5.0 L  (10.0-30.0)  ug/mL


 


Ur Barbiturates Screen     


 


Ur Phencyclidine Scrn     


 


Ur Amphetamines Screen     


 


U Benzodiazepines Scrn     


 


Urine Cocaine Screen     


 


U Marijuana (THC) Screen     


 


Drugs of Abuse Note     


 


Plasma/Serum Alcohol     (0-0.07)  %


 


SARS-CoV-2 (PCR)     (Negative)  














  07/06/22 07/06/22 07/07/22 Range/Units





  21:11 21:11 09:05 


 


WBC     (4.5-11.0)  K/mm3


 


RBC     (3.65-5.03)  M/mm3


 


Hgb     (11.8-15.2)  gm/dl


 


Hct     (35.5-45.6)  %


 


MCV     (84-94)  fl


 


MCH     (28-32)  pg


 


MCHC     (32-34)  %


 


RDW     (13.2-15.2)  %


 


Plt Count     (140-440)  K/mm3


 


Lymph % (Auto)     (13.4-35.0)  %


 


Mono % (Auto)     (0.0-7.3)  %


 


Eos % (Auto)     (0.0-4.3)  %


 


Baso % (Auto)     (0.0-1.8)  %


 


Lymph # (Auto)     (1.2-5.4)  K/mm3


 


Mono # (Auto)     (0.0-0.8)  K/mm3


 


Eos # (Auto)     (0.0-0.4)  K/mm3


 


Baso # (Auto)     (0.0-0.1)  K/mm3


 


Seg Neutrophils %     (40.0-70.0)  %


 


Seg Neutrophils #     (1.8-7.7)  K/mm3


 


Sodium  141    (137-145)  mmol/L


 


Potassium  3.5 L    (3.6-5.0)  mmol/L


 


Chloride  105.9    ()  mmol/L


 


Carbon Dioxide  22    (22-30)  mmol/L


 


Anion Gap  17    mmol/L


 


BUN  5 L    (9-20)  mg/dL


 


Creatinine  0.9    (0.8-1.3)  mg/dL


 


Estimated GFR  > 60    ml/min


 


BUN/Creatinine Ratio  6    %


 


Glucose  110 H    ()  mg/dL


 


Calcium  9.7    (8.4-10.2)  mg/dL


 


Total Bilirubin  0.30    (0.1-1.2)  mg/dL


 


AST  17    (5-40)  units/L


 


ALT  18    (7-56)  units/L


 


Alkaline Phosphatase  49    ()  units/L


 


Total Protein  6.7    (6.3-8.2)  g/dL


 


Albumin  4.3    (3.9-5)  g/dL


 


Albumin/Globulin Ratio  1.8    %


 


Urine Color     (Yellow)  


 


Urine Turbidity     (Clear)  


 


Urine pH     (5.0-7.0)  


 


Ur Specific Gravity     (1.003-1.030)  


 


Urine Protein     (Negative)  mg/dL


 


Urine Glucose (UA)     (Negative)  mg/dL


 


Urine Ketones     (Negative)  mg/dL


 


Urine Blood     (Negative)  


 


Urine Nitrite     (Negative)  


 


Urine Bilirubin     (Negative)  


 


Urine Urobilinogen     (<2.0)  mg/dL


 


Ur Leukocyte Esterase     (Negative)  


 


Urine WBC (Auto)     (0.0-6.0)  /HPF


 


Urine RBC (Auto)     


 


Urine Mucus     /HPF


 


Salicylates     (2.8-20.0)  mg/dL


 


Urine Opiates Screen     


 


Urine Methadone Screen     


 


Acetaminophen     (10.0-30.0)  ug/mL


 


Ur Barbiturates Screen     


 


Ur Phencyclidine Scrn     


 


Ur Amphetamines Screen     


 


U Benzodiazepines Scrn     


 


Urine Cocaine Screen     


 


U Marijuana (THC) Screen     


 


Drugs of Abuse Note     


 


Plasma/Serum Alcohol   < 0.01   (0-0.07)  %


 


SARS-CoV-2 (PCR)    Negative  (Negative)  














  07/07/22 07/07/22 Range/Units





  Unknown Unknown 


 


WBC    (4.5-11.0)  K/mm3


 


RBC    (3.65-5.03)  M/mm3


 


Hgb    (11.8-15.2)  gm/dl


 


Hct    (35.5-45.6)  %


 


MCV    (84-94)  fl


 


MCH    (28-32)  pg


 


MCHC    (32-34)  %


 


RDW    (13.2-15.2)  %


 


Plt Count    (140-440)  K/mm3


 


Lymph % (Auto)    (13.4-35.0)  %


 


Mono % (Auto)    (0.0-7.3)  %


 


Eos % (Auto)    (0.0-4.3)  %


 


Baso % (Auto)    (0.0-1.8)  %


 


Lymph # (Auto)    (1.2-5.4)  K/mm3


 


Mono # (Auto)    (0.0-0.8)  K/mm3


 


Eos # (Auto)    (0.0-0.4)  K/mm3


 


Baso # (Auto)    (0.0-0.1)  K/mm3


 


Seg Neutrophils %    (40.0-70.0)  %


 


Seg Neutrophils #    (1.8-7.7)  K/mm3


 


Sodium    (137-145)  mmol/L


 


Potassium    (3.6-5.0)  mmol/L


 


Chloride    ()  mmol/L


 


Carbon Dioxide    (22-30)  mmol/L


 


Anion Gap    mmol/L


 


BUN    (9-20)  mg/dL


 


Creatinine    (0.8-1.3)  mg/dL


 


Estimated GFR    ml/min


 


BUN/Creatinine Ratio    %


 


Glucose    ()  mg/dL


 


Calcium    (8.4-10.2)  mg/dL


 


Total Bilirubin    (0.1-1.2)  mg/dL


 


AST    (5-40)  units/L


 


ALT    (7-56)  units/L


 


Alkaline Phosphatase    ()  units/L


 


Total Protein    (6.3-8.2)  g/dL


 


Albumin    (3.9-5)  g/dL


 


Albumin/Globulin Ratio    %


 


Urine Color  Colorless   (Yellow)  


 


Urine Turbidity  Clear   (Clear)  


 


Urine pH  6.5   (5.0-7.0)  


 


Ur Specific Gravity  1.015   (1.003-1.030)  


 


Urine Protein  <30 mg dl   (Negative)  mg/dL


 


Urine Glucose (UA)  Negative   (Negative)  mg/dL


 


Urine Ketones  Negative   (Negative)  mg/dL


 


Urine Blood  Negative   (Negative)  


 


Urine Nitrite  Negative   (Negative)  


 


Urine Bilirubin  Negative   (Negative)  


 


Urine Urobilinogen  0.0   (<2.0)  mg/dL


 


Ur Leukocyte Esterase  Negative   (Negative)  


 


Urine WBC (Auto)  1.0   (0.0-6.0)  /HPF


 


Urine RBC (Auto)  Not Reportable   


 


Urine Mucus  Few   /HPF


 


Salicylates    (2.8-20.0)  mg/dL


 


Urine Opiates Screen   Negative  


 


Urine Methadone Screen   Negative  


 


Acetaminophen    (10.0-30.0)  ug/mL


 


Ur Barbiturates Screen   Negative  


 


Ur Phencyclidine Scrn   Negative  


 


Ur Amphetamines Screen   Negative  


 


U Benzodiazepines Scrn   Negative  


 


Urine Cocaine Screen   Negative  


 


U Marijuana (THC) Screen   Positive  


 


Drugs of Abuse Note   Disclamer  


 


Plasma/Serum Alcohol    (0-0.07)  %


 


SARS-CoV-2 (PCR)    (Negative)

## 2022-07-10 NOTE — PROGRESS NOTE
Subjective





- Reason for Consult


Reason for consult: MHE





- Chief Complaint


Chief complaint: 








07/10/2022: Patient seen today. Looking better than yesterday. Patient states 

that he is "fine" and taking his medications. Patient denies any SI/HI at this 

time. Patient is sleeping and appetite is good. Patient asked when he can go 

home, patient was told when he is medically cleared by all.





07/09/2022: Patient seen today in his room. Patient calmer today lying down on 

his bed. Patient states that "I just wants to take my medications" Patient 

states that he is eating well and sleeping well. Patient denies any SI/HI at 

this time. Patient will continue with current medications while waiting on 

inpatient treatment.











07/08/22:  Patient seen today, still very hyper verbal and aggressive. PPatient 

seen banging on his door and shouting. Unable to get any concrete assessment out

of him. No medication changes at this time.


History of Present Illness





- Reason for Consult


Consult date: 07/07/22


Reason for consult: psychosis





- History of Present Psychiatric Illness


HPI: The patient presents to the emergency department with a chief complaint of 

abandonment.  Patient has a past medical history of schizoaffective disorder.  

Patient states he was staying with a  for the last 2 to 3 weeks.  He 

states today he was told by the  that he was becoming too difficult to 

manage and brought him to the hospital for evaluation.  Patient slightly 

paranoid on exam.  He denies homicidal suicidal ideation.  He also denies 

auditory visual hallucinations.





The patient was seen today. He denies hallucinations, but he responding to 

internal stimuli. He is staring intensely and slow to respond. He doesn't 

respond to most questioning, only stares.  





PAST PSYCHIATRIC HISTORY:


Unable to assess





PAST MEDICAL HISTORY: None reported





Family Psychiatric History: None reported or documented





SOCIAL HISTORY


Unable to assess





REVIEW OF SYSTEMS


Unable to assess


 


MENTAL STATUS EXAMINATION


Unable to assess





Diagnoses: 


Schizoaffective Disorder





Treatment Plan


1013


Olanzapine 7.5mg po daily


Trazodone 50mg po qhs


Sitter: per primary


Medical: Per primary


Disposition: Recommend acute psychiatric inpatient treatment


Will follow. Thanks


Case staffed by Dr. Tan





Medications and Allergies


                                    Allergies











Allergy/AdvReac Type Severity Reaction Status Date / Time


 


No Known Allergies Allergy   Unverified 07/06/22 20:06














Mental Status Exam





- Vital signs


                                Last Vital Signs











Temp  98.6 F   07/10/22 09:22


 


Pulse  70   07/10/22 09:34


 


Resp  18   07/10/22 09:22


 


BP  150/95   07/10/22 09:34


 


Pulse Ox  100   07/10/22 09:22